# Patient Record
Sex: FEMALE | Race: OTHER | Employment: FULL TIME | URBAN - METROPOLITAN AREA
[De-identification: names, ages, dates, MRNs, and addresses within clinical notes are randomized per-mention and may not be internally consistent; named-entity substitution may affect disease eponyms.]

---

## 2020-11-11 ENCOUNTER — HOSPITAL ENCOUNTER (OUTPATIENT)
Age: 25
Discharge: HOME | End: 2020-11-11
Payer: COMMERCIAL

## 2020-11-11 VITALS
OXYGEN SATURATION: 96 % | WEIGHT: 160 LBS | BODY MASS INDEX: 28.35 KG/M2 | DIASTOLIC BLOOD PRESSURE: 88 MMHG | HEIGHT: 63 IN | SYSTOLIC BLOOD PRESSURE: 124 MMHG | TEMPERATURE: 98 F | RESPIRATION RATE: 16 BRPM | HEART RATE: 90 BPM

## 2020-11-11 DIAGNOSIS — B34.9 VIRAL SYNDROME: Primary | ICD-10-CM

## 2020-11-11 DIAGNOSIS — Z20.822 EXPOSURE TO COVID-19 VIRUS: ICD-10-CM

## 2020-11-11 PROCEDURE — 99203 OFFICE O/P NEW LOW 30 MIN: CPT | Performed by: PHYSICIAN ASSISTANT

## 2020-11-11 RX ORDER — DICLOFENAC SODIUM 75 MG/1
75 TABLET, DELAYED RELEASE ORAL 2 TIMES DAILY
COMMUNITY
Start: 2020-11-06 | End: 2020-11-20

## 2020-11-11 RX ORDER — CYCLOBENZAPRINE HCL 10 MG
10 TABLET ORAL 3 TIMES DAILY
COMMUNITY
Start: 2020-11-06 | End: 2020-11-11

## 2020-11-11 NOTE — ED INITIAL ASSESSMENT (HPI)
The patient is here for a COVID test. States this morning she woke up with a sore throat, dry cough with some intermittent SOB - has history of asthma, and runny nose.  Denies any fevers, chills, N/V/D, CP, ESTRELLA, loss of taste or smell, fatigue, body aches,

## 2020-11-11 NOTE — ED PROVIDER NOTES
Patient Seen in: [facility] [address]      History   Patient presents with:  Sore Throat    Stated Complaint: covid test    HPI    CHIEF COMPLAINT: COVID-19 testing    HISTORY OF PRESENT ILLNESS: Patient states that she woke this morning with Systems    Positive for stated complaint: covid test  Other systems are as noted in HPI. Constitutional and vital signs reviewed. All other systems reviewed and negative except as noted above.     Physical Exam     ED Triage Vitals [11/11/20 1305]   B discharge and follow-up instructions were provided to help prevent relapse or worsening. I discussed the case with the patient and they had no questions, complaints, or concerns.   Patient states they understand diagnosis, followup plan and agree with and

## 2021-04-09 DIAGNOSIS — Z23 NEED FOR VACCINATION: ICD-10-CM

## 2021-08-10 ENCOUNTER — LAB ENCOUNTER (OUTPATIENT)
Dept: LAB | Age: 26
End: 2021-08-10
Attending: INTERNAL MEDICINE

## 2021-08-10 DIAGNOSIS — J06.9 VIRAL UPPER RESPIRATORY TRACT INFECTION: ICD-10-CM

## 2021-08-12 LAB — SARS-COV-2 RNA RESP QL NAA+PROBE: NOT DETECTED

## 2021-12-24 ENCOUNTER — OFFICE VISIT (OUTPATIENT)
Dept: FAMILY MEDICINE CLINIC | Facility: CLINIC | Age: 26
End: 2021-12-24
Payer: COMMERCIAL

## 2021-12-24 VITALS
BODY MASS INDEX: 26.5 KG/M2 | OXYGEN SATURATION: 99 % | HEART RATE: 110 BPM | DIASTOLIC BLOOD PRESSURE: 65 MMHG | SYSTOLIC BLOOD PRESSURE: 115 MMHG | WEIGHT: 144 LBS | HEIGHT: 62 IN | TEMPERATURE: 97 F | RESPIRATION RATE: 18 BRPM

## 2021-12-24 DIAGNOSIS — Z20.822 SUSPECTED 2019 NOVEL CORONAVIRUS INFECTION: Primary | ICD-10-CM

## 2021-12-24 PROCEDURE — 3078F DIAST BP <80 MM HG: CPT | Performed by: NURSE PRACTITIONER

## 2021-12-24 PROCEDURE — 3074F SYST BP LT 130 MM HG: CPT | Performed by: NURSE PRACTITIONER

## 2021-12-24 PROCEDURE — 99213 OFFICE O/P EST LOW 20 MIN: CPT | Performed by: NURSE PRACTITIONER

## 2021-12-24 PROCEDURE — 3008F BODY MASS INDEX DOCD: CPT | Performed by: NURSE PRACTITIONER

## 2021-12-24 NOTE — PROGRESS NOTES
CHIEF COMPLAINT:   Patient presents with:  Covid      HPI:   Anil Powell is a 23year old female who presents for upper respiratory symptoms for  1 days. Patient reports feels hot, headache, upset stomach, fatigue.  Symptoms have been persistent since intact  EARS: TM's grey, no bulging, noretraction,no fluid, bony landmarks visible  NOSE: Nostrils patent, no nasal discharge, nasal mucosa not inflammed   THROAT: Oral mucosa pink, moist. Posterior pharynx is not erythematous. no exudates.    NECK: Supple,

## 2022-01-06 ENCOUNTER — LAB ENCOUNTER (OUTPATIENT)
Dept: LAB | Facility: HOSPITAL | Age: 27
End: 2022-01-06
Attending: INTERNAL MEDICINE
Payer: COMMERCIAL

## 2022-01-06 DIAGNOSIS — Z11.52 ENCOUNTER FOR SCREENING FOR SEVERE ACUTE RESPIRATORY SYNDROME CORONAVIRUS 2 (SARS-COV-2) INFECTION: ICD-10-CM

## 2022-01-06 DIAGNOSIS — Z20.828 EXPOSURE TO SARS-ASSOCIATED CORONAVIRUS: ICD-10-CM

## 2022-01-07 LAB — SARS-COV-2 RNA RESP QL NAA+PROBE: NOT DETECTED

## 2024-06-09 ENCOUNTER — HOSPITAL ENCOUNTER (EMERGENCY)
Age: 29
Discharge: HOME | End: 2024-06-10
Attending: EMERGENCY MEDICINE
Payer: COMMERCIAL

## 2024-06-09 DIAGNOSIS — K52.9 GASTROENTERITIS: Primary | ICD-10-CM

## 2024-06-09 LAB
B-HCG UR QL: NEGATIVE
BILIRUB UR QL STRIP.AUTO: NEGATIVE
COLOR UR AUTO: YELLOW
GLUCOSE UR STRIP.AUTO-MCNC: NEGATIVE MG/DL
NITRITE UR QL STRIP.AUTO: NEGATIVE
PH UR STRIP.AUTO: 8.5 [PH] (ref 5–8)
POCT INFLUENZA A: NEGATIVE
POCT INFLUENZA B: NEGATIVE
PROT UR STRIP.AUTO-MCNC: NEGATIVE MG/DL
RBC UR QL AUTO: NEGATIVE
SARS-COV-2 RNA RESP QL NAA+PROBE: NOT DETECTED
SP GR UR STRIP.AUTO: 1.02 (ref 1–1.03)
UROBILINOGEN UR STRIP.AUTO-MCNC: 1 MG/DL

## 2024-06-09 PROCEDURE — 99283 EMERGENCY DEPT VISIT LOW MDM: CPT

## 2024-06-09 PROCEDURE — 87086 URINE CULTURE/COLONY COUNT: CPT | Performed by: EMERGENCY MEDICINE

## 2024-06-09 PROCEDURE — 81001 URINALYSIS AUTO W/SCOPE: CPT | Performed by: EMERGENCY MEDICINE

## 2024-06-09 PROCEDURE — 81025 URINE PREGNANCY TEST: CPT

## 2024-06-09 PROCEDURE — 87430 STREP A AG IA: CPT | Performed by: EMERGENCY MEDICINE

## 2024-06-09 PROCEDURE — 99284 EMERGENCY DEPT VISIT MOD MDM: CPT

## 2024-06-09 PROCEDURE — 81015 MICROSCOPIC EXAM OF URINE: CPT | Performed by: EMERGENCY MEDICINE

## 2024-06-09 PROCEDURE — 87502 INFLUENZA DNA AMP PROBE: CPT | Performed by: EMERGENCY MEDICINE

## 2024-06-09 RX ORDER — DICYCLOMINE HYDROCHLORIDE 10 MG/1
10 CAPSULE ORAL
COMMUNITY

## 2024-06-09 RX ORDER — ONDANSETRON 4 MG/1
4 TABLET, FILM COATED ORAL ONCE
Status: COMPLETED | OUTPATIENT
Start: 2024-06-09 | End: 2024-06-09

## 2024-06-09 RX ORDER — MAGNESIUM HYDROXIDE/ALUMINUM HYDROXICE/SIMETHICONE 120; 1200; 1200 MG/30ML; MG/30ML; MG/30ML
30 SUSPENSION ORAL ONCE
Status: COMPLETED | OUTPATIENT
Start: 2024-06-09 | End: 2024-06-09

## 2024-06-09 RX ORDER — PANTOPRAZOLE SODIUM 40 MG/1
40 TABLET, DELAYED RELEASE ORAL ONCE
Status: COMPLETED | OUTPATIENT
Start: 2024-06-09 | End: 2024-06-09

## 2024-06-10 VITALS
RESPIRATION RATE: 18 BRPM | DIASTOLIC BLOOD PRESSURE: 76 MMHG | HEIGHT: 67 IN | BODY MASS INDEX: 21.66 KG/M2 | HEART RATE: 68 BPM | SYSTOLIC BLOOD PRESSURE: 116 MMHG | OXYGEN SATURATION: 99 % | TEMPERATURE: 98 F | WEIGHT: 138 LBS

## 2024-06-10 PROCEDURE — 96372 THER/PROPH/DIAG INJ SC/IM: CPT

## 2024-06-10 RX ORDER — DICYCLOMINE HYDROCHLORIDE 10 MG/ML
10 INJECTION INTRAMUSCULAR ONCE
Status: COMPLETED | OUTPATIENT
Start: 2024-06-10 | End: 2024-06-10

## 2024-06-10 RX ORDER — PANTOPRAZOLE SODIUM 20 MG/1
20 TABLET, DELAYED RELEASE ORAL DAILY
Qty: 30 TABLET | Refills: 0 | Status: SHIPPED | OUTPATIENT
Start: 2024-06-10 | End: 2024-07-10

## 2024-06-10 RX ORDER — SUCRALFATE 1 G/1
1 TABLET ORAL
Qty: 40 TABLET | Refills: 0 | Status: SHIPPED | OUTPATIENT
Start: 2024-06-10 | End: 2024-06-20

## 2024-06-10 NOTE — DISCHARGE INSTRUCTIONS
Drink a great deal of fluids and rest tomorrow-  Take protonix and carafate  Urine culture will be pending

## 2024-06-10 NOTE — ED PROVIDER NOTES
Patient Seen in: [facility] [place]      History     Chief Complaint   Patient presents with    Abdomen/Flank Pain    Dizziness     Stated Complaint: stomach pain since friday, has been taking tums and it hasnt gotten better, fee*    Subjective:   HPI    29 yo presents with abdomen pain and epigastric pain and felt very sick with cough and body aches- works at a  and children have had gastrintestinal illness  No fever an dno diarrhea and increased gas   Feels nauseous and dizzy   Also with some shortness of breath when abdominal cramping andpain worsens        Symptoms started Friday       Objective:   Past Medical History:    Asthma (HCC)    Esophageal reflux    Extrinsic asthma, unspecified              Past Surgical History:   Procedure Laterality Date    Cholecystectomy  2016    Laparoscopic cholecystectomy N/A 9/21/2015    Procedure: LAPAROSCOPIC CHOLECYSTECTOMY;  Surgeon: Jerry Castro MD;  Location:  MAIN OR                Social History     Socioeconomic History    Marital status: Single   Tobacco Use    Smoking status: Never    Smokeless tobacco: Never   Vaping Use    Vaping status: Never Used   Substance and Sexual Activity    Alcohol use: No    Drug use: No     Social Determinants of Health      [facility], [facility]    Social Connections    Received from [facility], [facility]    Housing Stability              Review of Systems    Positive for stated complaint: stomach pain since friday, has been taking tums and it hasnt gotten better, fee*  Other systems are as noted in HPI.  Constitutional and vital signs reviewed.      All other systems reviewed and negative except as noted above.    Physical Exam     ED Triage Vitals   BP 06/09/24 2108 116/78   Pulse 06/09/24 2108 83   Resp 06/09/24 2108 24   Temp 06/09/24 2108 98.2 °F (36.8 °C)   Temp src 06/09/24 2108 Oral   SpO2 06/09/24  2108 100 %   O2 Device 06/09/24 2220 None (Room air)       Current Vitals:   Vital Signs  BP: 109/77  Pulse: 68  Resp: 18  Temp: 98.2 °F (36.8 °C)  Temp src: Oral    Oxygen Therapy  SpO2: 99 %  O2 Device: None (Room air)            Physical Exam  Vitals and nursing note reviewed.   Constitutional:       General: She is not in acute distress.     Appearance: She is well-developed. She is not diaphoretic.   HENT:      Head: Atraumatic.      Right Ear: External ear normal.      Left Ear: External ear normal.      Nose: Nose normal.   Eyes:      General: No scleral icterus.        Right eye: No discharge.         Left eye: No discharge.      Conjunctiva/sclera: Conjunctivae normal.      Pupils: Pupils are equal, round, and reactive to light.   Neck:      Vascular: No JVD.   Cardiovascular:      Rate and Rhythm: Normal rate and regular rhythm.      Heart sounds: Normal heart sounds. No murmur heard.     No friction rub. No gallop.   Pulmonary:      Effort: Pulmonary effort is normal. No respiratory distress.      Breath sounds: Normal breath sounds. No stridor. No wheezing.   Chest:      Chest wall: No tenderness.   Abdominal:      General: Bowel sounds are normal. There is no distension.      Palpations: Abdomen is soft.      Tenderness: There is abdominal tenderness in the epigastric area. There is no guarding or rebound.   Musculoskeletal:         General: No tenderness or deformity. Normal range of motion.      Cervical back: Normal range of motion and neck supple.   Lymphadenopathy:      Cervical: No cervical adenopathy.   Skin:     General: Skin is warm and dry.      Coloration: Skin is not pale.      Findings: No erythema or rash.   Neurological:      Mental Status: She is alert and oriented to person, place, and time.      Cranial Nerves: No cranial nerve deficit.      Coordination: Coordination normal.   Psychiatric:         Behavior: Behavior normal.         Judgment: Judgment normal.               ED Course      Labs Reviewed   URINALYSIS WITH CULTURE REFLEX - Abnormal; Notable for the following components:       Result Value    Clarity Urine Cloudy (*)     Ketones Urine Trace (*)     pH Urine 8.5 (*)     Urobilinogen Urine 1.0 (*)     Leukocyte Esterase Urine Trace (*)     Bacteria Urine 1+ (*)     Squamous Epi. Cells Few (*)     All other components within normal limits   UA MICROSCOPIC ONLY, URINE - Abnormal; Notable for the following components:    Bacteria Urine 1+ (*)     Squamous Epi. Cells Few (*)     All other components within normal limits   POCT PREGNANCY URINE - Normal   RAPID SARS-COV-2 BY PCR - Normal   POCT FLU TEST - Normal    Narrative:     This assay is a rapid molecular in vitro test utilizing nucleic acid amplification of influenza A and B viral RNA.   RAPID STREP A SCREEN (LC) - Normal    Narrative:     A confirmatory culture is recommended if clinically indicated.   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  Now with ongoing abdominal pain, epigastric pain, nausea.  Nausea improving with Zofran.  Differential diagnosis includes COVID, flu, strep, I do not think this patient has pancreatitis she does not have a gallbladder so she does not have acute cholecystitis she may have GERD or gastritis and I do think she definitely has gastroenteritis.  Discussed this with the patient.  Maalox and Protonix given with some mild improvement in pain will give a dose of Bentyl for antispasmodic.  She will likely need to rest over the next few days and hydrate well.  Stable for discharge home                                   Medical Decision Making      Disposition and Plan     Clinical Impression:  1. Gastroenteritis         Disposition:  Discharge  6/10/2024 12:47 am    Follow-up:  No follow-up provider  specified.        Medications Prescribed:  Current Discharge Medication List        START taking these medications    Details   pantoprazole 20 MG Oral Tab EC Take 1 tablet (20 mg total) by mouth daily.  Qty: 30 tablet, Refills: 0      sucralfate 1 g Oral Tab Take 1 tablet (1 g total) by mouth 4 (four) times daily before meals and nightly for 10 days.  Qty: 40 tablet, Refills: 0

## 2025-02-27 ENCOUNTER — OFFICE VISIT (OUTPATIENT)
Facility: CLINIC | Age: 30
End: 2025-02-27
Payer: COMMERCIAL

## 2025-02-27 VITALS
HEIGHT: 67 IN | DIASTOLIC BLOOD PRESSURE: 70 MMHG | HEART RATE: 109 BPM | WEIGHT: 137.5 LBS | SYSTOLIC BLOOD PRESSURE: 110 MMHG | BODY MASS INDEX: 21.58 KG/M2

## 2025-02-27 DIAGNOSIS — Z01.419 ENCOUNTER FOR WELL WOMAN EXAM WITH ROUTINE GYNECOLOGICAL EXAM: Primary | ICD-10-CM

## 2025-02-27 PROCEDURE — 3008F BODY MASS INDEX DOCD: CPT

## 2025-02-27 PROCEDURE — 3078F DIAST BP <80 MM HG: CPT

## 2025-02-27 PROCEDURE — 3074F SYST BP LT 130 MM HG: CPT

## 2025-02-27 PROCEDURE — 99385 PREV VISIT NEW AGE 18-39: CPT

## 2025-02-27 NOTE — PROGRESS NOTES
Jyoti Wing is a 29 year old female  Patient's last menstrual period was 2025 (exact date).   Chief Complaint   Patient presents with    Annual     Annual exam    She has been trying to conceive for 6-7 months. Her periods are every 30-35 day cycle. Bleeds for 5-6 days. Flow: days 1-2 heavy, days 3-6 moderate to light flow.   PCP: Dr. Santoyo   HPV vaccine up to date.     OBSTETRICS HISTORY:  OB History    Para Term  AB Living   0 0 0 0 0 0   SAB IAB Ectopic Multiple Live Births   0 0 0 0 0       GYNE HISTORY:  Periods  regular      History   Sexual Activity    Sexual activity: Yes     Comment: Trying to conceive        Hx Prior Abnormal Pap: No  Pap Date: 23  Pap Result Notes: wnl        MEDICAL HISTORY:  Past Medical History:    Asthma (HCC)    Esophageal reflux    Extrinsic asthma, unspecified       SURGICAL HISTORY:  Past Surgical History:   Procedure Laterality Date    Cholecystectomy      Laparoscopic cholecystectomy N/A 2015    Procedure: LAPAROSCOPIC CHOLECYSTECTOMY;  Surgeon: Jerry Castro MD;  Location:  MAIN OR       SOCIAL HISTORY:  Social History     Socioeconomic History    Marital status: Single     Spouse name: Not on file    Number of children: Not on file    Years of education: Not on file    Highest education level: Not on file   Occupational History    Not on file   Tobacco Use    Smoking status: Never    Smokeless tobacco: Never   Vaping Use    Vaping status: Never Used   Substance and Sexual Activity    Alcohol use: No    Drug use: No    Sexual activity: Yes     Comment: Trying to conceive   Other Topics Concern    Caffeine Concern Not Asked    Exercise Not Asked    Seat Belt Not Asked    Special Diet Not Asked    Stress Concern Not Asked    Weight Concern Not Asked   Social History Narrative    Not on file     Social Drivers of Health     Food Insecurity: Not on file   Transportation Needs: Not on file   Stress: Not on file   Housing Stability:  Low Risk  (7/8/2021)    Received from [facility], [facility]    Housing Stability     Mortgage Payment Concerns?: Not on file     Number of Places Lived in the Last Year: Not on file     Unstable Housing?: Not on file       FAMILY HISTORY:  Family History   Problem Relation Age of Onset    No Known Problems Father     Hypertension Mother     Diabetes Mother     Lipids Mother        MEDICATIONS:    Current Outpatient Medications:     montelukast (SINGULAIR) 10 MG Oral Tab, Take 1 tablet (10 mg total) by mouth daily., Disp: 90 tablet, Rfl: 1    metFORMIN  MG Oral Tablet 24 Hr, Take 1 tablet (500 mg total) by mouth 2 (two) times daily with meals., Disp: 180 tablet, Rfl: 0    Budesonide-Formoterol Fumarate (SYMBICORT) 160-4.5 MCG/ACT Inhalation Aerosol, Inhale 2 puffs into the lungs 2 (two) times daily., Disp: 1 each, Rfl: 0    Albuterol-Budesonide (AIRSUPRA) 90-80 MCG/ACT Inhalation Aerosol, Inhale 2 puffs into the lungs 4 (four) times daily as needed., Disp: 32.1 g, Rfl: 0    albuterol (2.5 MG/3ML) 0.083% Inhalation Nebu Soln, Take 3 mL (2.5 mg total) by nebulization every 6 (six) hours as needed for Wheezing., Disp: 100 each, Rfl: 0    ALLERGIES:  Allergies[1]      Review of Systems:  Constitutional:  Denies fatigue, night sweats, hot flashes  Eyes:  denies blurred or double vision  Cardiovascular:  denies chest pain or palpitations  Respiratory:  denies shortness of breath  Gastrointestinal:  denies heartburn, abdominal pain, diarrhea or constipation  Genitourinary:  denies dysuria, incontinence, abnormal vaginal discharge, vaginal itching  Musculoskeletal:  denies back pain.  Skin/Breast:  Denies any breast pain, lumps, or discharge.   Neurological:  denies headaches, extremity weakness or numbness.  Psychiatric: denies depression or anxiety.  Endocrine:   denies excessive thirst or urination.  Heme/Lymph:  denies history of anemia, easy bruising or  bleeding.      PHYSICAL EXAM:   Constitutional: well developed, well nourished  Head/Face: normocephalic  Neck/Thyroid: thyroid symmetric, no thyromegaly, no nodules, no adenopathy  Lymphatic:no abnormal supraclavicular or axillary adenopathy is noted  Breast: normal without palpable masses, tenderness, asymmetry, nipple discharge, nipple retraction or skin changes  Abdomen:  soft, nontender, nondistended, no masses  Skin/Hair: no unusual rashes or bruises  Extremities: no edema, no cyanosis  Psychiatric:  Oriented to time, place, person and situation. Appropriate mood and affect    Pelvic Exam:  External Genitalia: normal appearance, hair distribution, and no lesions  Urethral Meatus:  normal in size, location, without lesions and prolapse  Bladder:  No fullness, masses or tenderness  Vagina:  Normal appearance without lesions, no abnormal discharge  Cervix:  Normal without tenderness on motion  Uterus: normal in size, contour, position, mobility, without tenderness  Adnexa: normal without masses or tenderness  Perineum: normal  Anus: no hemorroids     Assessment & Plan:  Diagnoses and all orders for this visit:    Encounter for well woman exam with routine gynecological exam      D/w pt ovulation predictor kit, cervical mucus changes, and days 12-14 of her menses, most likely she is ovulating, have sexual intercourse every other day. After one year of consistently trying to conceive, recommend infertility consult with OBs.   Advise her to start taking prenatal vitamins and folic acid          [1] No Known Allergies

## 2025-04-02 ENCOUNTER — HOSPITAL ENCOUNTER (OUTPATIENT)
Age: 30
Discharge: HOME | End: 2025-04-02
Payer: COMMERCIAL

## 2025-04-02 VITALS
TEMPERATURE: 98 F | HEART RATE: 122 BPM | OXYGEN SATURATION: 97 % | RESPIRATION RATE: 26 BRPM | DIASTOLIC BLOOD PRESSURE: 79 MMHG | SYSTOLIC BLOOD PRESSURE: 116 MMHG

## 2025-04-02 DIAGNOSIS — J45.41 MODERATE PERSISTENT ASTHMA WITH EXACERBATION (HCC): Primary | ICD-10-CM

## 2025-04-02 PROCEDURE — 94640 AIRWAY INHALATION TREATMENT: CPT

## 2025-04-02 PROCEDURE — 99214 OFFICE O/P EST MOD 30 MIN: CPT

## 2025-04-02 RX ORDER — IPRATROPIUM BROMIDE AND ALBUTEROL SULFATE 2.5; .5 MG/3ML; MG/3ML
3 SOLUTION RESPIRATORY (INHALATION) ONCE
Status: COMPLETED | OUTPATIENT
Start: 2025-04-02 | End: 2025-04-02

## 2025-04-02 RX ORDER — TIZANIDINE 2 MG/1
2 TABLET ORAL 2 TIMES DAILY PRN
Qty: 20 TABLET | Refills: 0 | Status: SHIPPED | OUTPATIENT
Start: 2025-04-02 | End: 2025-04-02

## 2025-04-02 RX ORDER — KETOROLAC TROMETHAMINE 10 MG/1
10 TABLET, FILM COATED ORAL EVERY 8 HOURS PRN
Qty: 20 TABLET | Refills: 0 | Status: SHIPPED | OUTPATIENT
Start: 2025-04-02 | End: 2025-04-02

## 2025-04-02 RX ORDER — IPRATROPIUM BROMIDE AND ALBUTEROL SULFATE 2.5; .5 MG/3ML; MG/3ML
3 SOLUTION RESPIRATORY (INHALATION)
Qty: 30 EACH | Refills: 0 | Status: SHIPPED | OUTPATIENT
Start: 2025-04-02

## 2025-04-02 RX ORDER — PREDNISONE 20 MG/1
60 TABLET ORAL ONCE
Status: COMPLETED | OUTPATIENT
Start: 2025-04-02 | End: 2025-04-02

## 2025-04-02 RX ORDER — PREDNISONE 20 MG/1
40 TABLET ORAL DAILY
Qty: 10 TABLET | Refills: 0 | Status: SHIPPED | OUTPATIENT
Start: 2025-04-02 | End: 2025-04-07

## 2025-04-02 NOTE — ED PROVIDER NOTES
History     Chief Complaint   Patient presents with    Cough    Chest Pain       Subjective:   HPI    Jyoti Wing, 29 year old female with notable medical history of asthma, GERD who presents with SOB. PATIENT reports s/s started a couple days ago with patient starting to use her inhaler. She reports minimal improvement and saw her primary care provider yesterday with patient being Dx with allergic rhinitis and advised on Albuterol use. Today, she reports symptoms worsening w/ patient being unable to perform activities without needing to rest d/t SOB. Denies fever, chills, n/v/d.      Patient Active Problem List   Diagnosis    Asthma (HCC)    Esophageal reflux      Objective:   Past Medical History:    Asthma (HCC)    Esophageal reflux    Extrinsic asthma, unspecified              Past Surgical History:   Procedure Laterality Date    Cholecystectomy  2016    Laparoscopic cholecystectomy N/A 9/21/2015    Procedure: LAPAROSCOPIC CHOLECYSTECTOMY;  Surgeon: Jerry Castro MD;  Location:  MAIN OR                Social History     Socioeconomic History    Marital status: Single   Tobacco Use    Smoking status: Never    Smokeless tobacco: Never   Vaping Use    Vaping status: Never Used   Substance and Sexual Activity    Alcohol use: No    Drug use: No    Sexual activity: Yes     Comment: Trying to conceive     Social Drivers of Health      [facility] from [facility], [facility]    Housing Stability              Medications Ordered Prior to Encounter[1]      Constitutional and vital signs reviewed.      All other systems reviewed and negative except as noted above.    I have reviewed the family history, social history, allergies, and outpatient medications.     History reviewed from EMR: Encounters, problem list, allergies, medications      Physical Exam     ED Triage Vitals [04/02/25 1238]   /79   Pulse (!) 122   Resp 26   Temp 98.3 °F (36.8 °C)   Temp src Oral   SpO2  97 %   O2 Device None (Room air)       Current:/79   Pulse (!) 122   Temp 98.3 °F (36.8 °C) (Oral)   Resp 26   LMP 03/30/2025 (Exact Date)   SpO2 97%       Physical Exam  Vitals and nursing note reviewed.   Constitutional:       General: She is in acute distress.      Appearance: Normal appearance. She is normal weight. She is not ill-appearing or toxic-appearing.   HENT:      Head: Normocephalic and atraumatic.      Right Ear: External ear normal.      Left Ear: External ear normal.      Nose: Nose normal.      Mouth/Throat:      Mouth: Mucous membranes are moist.   Eyes:      Extraocular Movements: Extraocular movements intact.      Conjunctiva/sclera: Conjunctivae normal.      Pupils: Pupils are equal, round, and reactive to light.   Cardiovascular:      Rate and Rhythm: Regular rhythm. Tachycardia present.      Pulses: Normal pulses.      Heart sounds: Normal heart sounds.   Pulmonary:      Effort: Pulmonary effort is normal. No respiratory distress.      Breath sounds: Wheezing present.      Comments: Mild distress. Diffuse wheezing. Speaking in short sentences  Musculoskeletal:         General: No swelling, tenderness or signs of injury. Normal range of motion.      Cervical back: Normal range of motion and neck supple.   Skin:     General: Skin is warm and dry.      Capillary Refill: Capillary refill takes less than 2 seconds.      Coloration: Skin is not jaundiced.   Neurological:      General: No focal deficit present.      Mental Status: She is alert and oriented to person, place, and time. Mental status is at baseline.   Psychiatric:         Mood and Affect: Mood normal.         Behavior: Behavior normal.         Thought Content: Thought content normal.         Judgment: Judgment normal.            ED Course     Labs Reviewed - No data to display  No orders to display       Vitals:    04/02/25 1238   BP: 116/79   Pulse: (!) 122   Resp: 26   Temp: 98.3 °F (36.8 °C)   TempSrc: Oral   SpO2: 97%             KENNETH Wing, 29 year old female with medical history as noted above who presents with asthma exacerbation   - PATIENT in mild distress, +tachycardia (likely at least in part r/t Albuterol use)   - Patient w/ diffuse wheezing   - Duo neb x2, Prednisone, monitor       ** See ED course below for additional information on care provided / interventions / notable events throughout patient's encounter.    ** See Home Care Instructions below for care measures to trial as applicable.    ED Course as of 04/02/25 1331  ------------------------------------------------------------  Time: 04/02 1329  Comment: Patient reports feeling better and is w/o chest pain or tightness  LS reassessed and much clearer with just scant end expiratory wheeze to RLL  Patient reports feeling comfortable going home  RTED/IC precautions discussed  Follow up with primary care provider as needed        ** I have independently reviewed the radiology images, clinical lab results, and ECG tracings as described above (if applicable)    ** Concerning co-morbidities possibly affecting complaint / care: asthma    ** See disposition & plan section below for home care instructions - if applicable        Medical Decision Making  Risk  OTC drugs.  Prescription drug management.        Disposition and Plan     Disposition:  Discharge  4/2/2025  1:31 pm    Clinical Impression:  1. Moderate persistent asthma with exacerbation (HCC)            Home care instructions:      Asthma supportive care measures:   - Take Prednisone as directed and to completion   - Use inhaler and/or nebulizer as needed for shortness of breath, chest tightness, and/or wheezing   - You may benefit from taking a daily allergy medication (e.g. Zyrtec, Xyzal)   - Avoid having air blow on you at night or sleeping with your window open   - If applicable, deep clean carpets and air vents to reduce allergens    - Follow up with your doctor as needed and to discuss asthma  action plan components   - Return to ED if breathing worsens       Viral Illness supportive care measures to try as applicable:  General:   - There are multiple viruses that cause similar symptoms, including: Influenza, Rhinovirus, Adenovirus, Coronaviruses (including Covid-19), RSV, parainfluenza, etc.   - Duration of symptoms typically depends on your body's ability to heal itself, which can be affected in many ways including metabolic health, diet, and genetics.    - Symptoms typically last between 7-days to 3-weeks   - Most medications do not not cure the illness, but may help to alleviate your symptoms. However, evidence is not strong.   - Antibiotics are NOT effective for viral illnesses   - Drink plenty of fluids (water, Pedialyte, etc.)   - Get plenty of rest   - Take a multivitamin and extra Vitamin D (~2000IU+) daily, year round   - Vitamin C can help reduce symptoms if you become infected, but is more effective if taken before becoming ill   - You may benefit from Zinc (~20mg) every day, or every other other day, for a week while sick (Zinc has been shown to kill respiratory viruses)   - Alternate Naproxen / Aleve (adult: 440-500mg) & Tylenol (adult: 650-1000mg) as needed for pain / body aches / fever   - Limit excess sugar intake (can worsen inflammation caused by virus)    Infection Control:   - Wash hands often, change toothbrush, & disinfect \"high-touch\" items to limit viral spread   - Do not share utensils or drinks    Sore throat:   - Salt water gargles & Lozenges (Cepacol or Ricola)    Sinus:   - Using saline spray or a couple drops into nostrils a couple times a day can help with sinus inflammation & move mucus   - Avoid having air blow on your face as this can worsen congestion   - You may benefit from placing a garlic clove in each nostril for 10-15min which should irritate sinuses, causing you to get rid of stuck mucus. Blow nose afterwards.   - You may benefit from taking a decongestant (e.g.  Sudafed - pseudoephedrine [behind the pharmacy counter]) (may temporarily elevate your heart rate and blood pressure)   - You may benefit from using a humidifier and/or steam showers then blow nose   - You may benefit from Flonase nasal spray daily (use with head tilted down and tip pointed towards outside of eye. Breath normally. You should not feel medication go down your throat)   - You may benefit from taking a daily allergy medication (e.g. Zyrtec, Xyzal, etc.)   - You may benefit from boiling water with lemon and cayenne pepper, then breathing in the steam (you can cover your head with a towel to help funnel the steam)    Cough:   - You may benefit from spoonfuls of honey (or added to warm drinks) throughout the day   - You may benefit from cough medication containing \"Dextromethorphan [DM]\" (e.g. Delsym)   - Sleeping in an upright position        Follow-up:  Luis Alberto Santoyo MD  [address] [place] [address]  [phone]      As needed    Luis Alberto Santoyo MD  [address] [place] [address]  [address] [place] [address]  [phone]      As needed          Medications Prescribed:  Current Discharge Medication List        START taking these medications    Details   predniSONE 20 MG Oral Tab Take 2 tablets (40 mg total) by mouth daily for 5 days.  Qty: 10 tablet, Refills: 0      ipratropium-albuterol 0.5-2.5 (3) MG/3ML Inhalation Solution Take 3 mL by nebulization every 4 to 6 hours as needed (wheezing, bronchospasm).  Qty: 30 each, Refills: 0               Gustabo Engle, DNP, APRN, AGACNP-BC, FNP-C, CNL  Adult-Gerontology Acute Care & Family Nurse Practitioner  [facility]      The above patient (and/or guardian) was made aware that an appropriate evaluation has been performed, and that no additional testing is required at this time. In my medical judgment, there is currently no evidence of an immediate life-threatening or surgical condition, therefore discharge is indicated at this time. The  patient (and/or guardian) was advised that a small risk still exists that a serious condition could develop. The patient was instructed to arrange close follow-up with their primary care provider (or the referral provider given today). The patient received written and verbal instructions regarding their condition / concerns, demonstrated understanding, and is agreement with the outpatient treatment plan.            [1]   No current facility-administered medications on file prior to encounter.     Current Outpatient Medications on File Prior to Encounter   Medication Sig Dispense Refill    albuterol (2.5 MG/3ML) 0.083% Inhalation Nebu Soln Take 3 mL (2.5 mg total) by nebulization every 6 (six) hours as needed for Wheezing. 100 each 0    albuterol (VENTOLIN HFA) 108 (90 Base) MCG/ACT Inhalation Aero Soln Inhale 2 puffs into the lungs every 4 (four) hours as needed for Wheezing. 1 each 0    Phentermine HCl 15 MG Oral Cap Take 1 capsule (15 mg total) by mouth every morning. 30 capsule 0    Albuterol-Budesonide (AIRSUPRA) 90-80 MCG/ACT Inhalation Aerosol Inhale 2 puffs into the lungs 4 (four) times daily as needed. 32.1 g 0    montelukast (SINGULAIR) 10 MG Oral Tab Take 1 tablet (10 mg total) by mouth daily. 90 tablet 1    Budesonide-Formoterol Fumarate (SYMBICORT) 160-4.5 MCG/ACT Inhalation Aerosol Inhale 2 puffs into the lungs 2 (two) times daily. 1 each 0    [] azithromycin (ZITHROMAX Z-WILLIAMS) 250 MG Oral Tab Take 2 tablets (500 mg total) by mouth daily for 1 day, THEN 1 tablet (250 mg total) daily for 4 days. 6 tablet 0

## 2025-04-02 NOTE — DISCHARGE INSTRUCTIONS
Asthma supportive care measures:   - Take Prednisone as directed and to completion   - Use inhaler and/or nebulizer as needed for shortness of breath, chest tightness, and/or wheezing   - You may benefit from taking a daily allergy medication (e.g. Zyrtec, Xyzal)   - Avoid having air blow on you at night or sleeping with your window open   - If applicable, deep clean carpets and air vents to reduce allergens    - Follow up with your doctor as needed and to discuss asthma action plan components   - Return to ED if breathing worsens       Viral Illness supportive care measures to try as applicable:  General:   - There are multiple viruses that cause similar symptoms, including: Influenza, Rhinovirus, Adenovirus, Coronaviruses (including Covid-19), RSV, parainfluenza, etc.   - Duration of symptoms typically depends on your body's ability to heal itself, which can be affected in many ways including metabolic health, diet, and genetics.    - Symptoms typically last between 7-days to 3-weeks   - Most medications do not not cure the illness, but may help to alleviate your symptoms. However, evidence is not strong.   - Antibiotics are NOT effective for viral illnesses   - Drink plenty of fluids (water, Pedialyte, etc.)   - Get plenty of rest   - Take a multivitamin and extra Vitamin D (~2000IU+) daily, year round   - Vitamin C can help reduce symptoms if you become infected, but is more effective if taken before becoming ill   - You may benefit from Zinc (~20mg) every day, or every other other day, for a week while sick (Zinc has been shown to kill respiratory viruses)   - Alternate Naproxen / Aleve (adult: 440-500mg) & Tylenol (adult: 650-1000mg) as needed for pain / body aches / fever   - Limit excess sugar intake (can worsen inflammation caused by virus)    Infection Control:   - Wash hands often, change toothbrush, & disinfect \"high-touch\" items to limit viral spread   - Do not share utensils or drinks    Sore  throat:   - Salt water gargles & Lozenges (Cepacol or Ricola)    Sinus:   - Using saline spray or a couple drops into nostrils a couple times a day can help with sinus inflammation & move mucus   - Avoid having air blow on your face as this can worsen congestion   - You may benefit from placing a garlic clove in each nostril for 10-15min which should irritate sinuses, causing you to get rid of stuck mucus. Blow nose afterwards.   - You may benefit from taking a decongestant (e.g. Sudafed - pseudoephedrine [behind the pharmacy counter]) (may temporarily elevate your heart rate and blood pressure)   - You may benefit from using a humidifier and/or steam showers then blow nose   - You may benefit from Flonase nasal spray daily (use with head tilted down and tip pointed towards outside of eye. Breath normally. You should not feel medication go down your throat)   - You may benefit from taking a daily allergy medication (e.g. Zyrtec, Xyzal, etc.)   - You may benefit from boiling water with lemon and cayenne pepper, then breathing in the steam (you can cover your head with a towel to help funnel the steam)    Cough:   - You may benefit from spoonfuls of honey (or added to warm drinks) throughout the day   - You may benefit from cough medication containing \"Dextromethorphan [DM]\" (e.g. Delsym)   - Sleeping in an upright position

## 2025-04-02 NOTE — ED INITIAL ASSESSMENT (HPI)
C/o dry cough since Saturday. Mid chest pain started today, shortness of breath with activity and when coughing. Seen PCP yesterday-given Albuterol inhaler and nebulizer. Is Asthmatic.

## 2025-08-19 ENCOUNTER — TELEPHONE (OUTPATIENT)
Dept: OBGYN CLINIC | Facility: CLINIC | Age: 30
End: 2025-08-19

## 2025-08-19 DIAGNOSIS — N91.2 AMENORRHEA: Primary | ICD-10-CM

## 2025-08-27 ENCOUNTER — ULTRASOUND ENCOUNTER (OUTPATIENT)
Dept: OBGYN CLINIC | Facility: CLINIC | Age: 30
End: 2025-08-27

## 2025-08-27 ENCOUNTER — OFFICE VISIT (OUTPATIENT)
Dept: OBGYN CLINIC | Facility: CLINIC | Age: 30
End: 2025-08-27

## 2025-08-27 VITALS
HEIGHT: 62 IN | WEIGHT: 160 LBS | TEMPERATURE: 98 F | BODY MASS INDEX: 29.44 KG/M2 | HEART RATE: 83 BPM | SYSTOLIC BLOOD PRESSURE: 110 MMHG | RESPIRATION RATE: 16 BRPM | DIASTOLIC BLOOD PRESSURE: 72 MMHG

## 2025-08-27 DIAGNOSIS — N91.1 SECONDARY AMENORRHEA: Primary | ICD-10-CM

## 2025-08-27 PROCEDURE — 76830 TRANSVAGINAL US NON-OB: CPT | Performed by: OBSTETRICS & GYNECOLOGY

## 2025-08-27 PROCEDURE — 99203 OFFICE O/P NEW LOW 30 MIN: CPT | Performed by: OBSTETRICS & GYNECOLOGY

## 2025-08-27 PROCEDURE — 3078F DIAST BP <80 MM HG: CPT | Performed by: OBSTETRICS & GYNECOLOGY

## 2025-08-27 PROCEDURE — 3008F BODY MASS INDEX DOCD: CPT | Performed by: OBSTETRICS & GYNECOLOGY

## 2025-08-27 PROCEDURE — 3074F SYST BP LT 130 MM HG: CPT | Performed by: OBSTETRICS & GYNECOLOGY

## (undated) NOTE — LETTER
[facility] [address]  David Marin [phone]     Patient: Genie Mcfadden   YOB: 1995   Date of Visit: 11/11/2020     Dear Employer,        November 11, 2020    At [facility], we are ta Persons infected with SARS-CoV-2 who never develop COVID-19 symptoms may discontinue isolation and other precautions 10 days after the date of their first positive RT-PCR test for SARS-CoV-2 RNA.     Persons who are asymptomatic but have been exposed, CDC r

## (undated) NOTE — LETTER
Date & Time: 4/2/2025, 1:31 PM  Patient: Jyoti Wing  Encounter Provider(s):    Gustabo Engle APRN       To Whom It May Concern:    Jyoti Wing was seen and treated in our department on 04/02/25. Please excuse her from work until 04/03/25 when she can return if without fever (<100.4) and if feeling better.    If you have any questions or concerns, please do not hesitate to call.        __________________________________________  Gustabo Engle DNP, APRN, AGACNP-BC, FNP-C, CNL  Adult-Gerontology Acute Care & Family Nurse Practitioner  [facility]

## (undated) NOTE — LETTER
Date & Time: 6/10/2024, 12:57 AM  Patient: Jyoti Wing  Encounter Provider(s):    Keshia Poe MD       To Whom It May Concern:    Jyoti Wing was seen and treated in our department on 6/9/2024. She should not return to work until 06/12/2024 .    If you have any questions or concerns, please do not hesitate to call.        _____________________________  Physician/APC Signature